# Patient Record
(demographics unavailable — no encounter records)

---

## 2025-05-01 NOTE — PHYSICAL EXAM
[MA] : MA [FreeTextEntry2] : Marychuy Velazquez [Appropriately responsive] : appropriately responsive [Alert] : alert [No Acute Distress] : no acute distress [Oriented x3] : oriented x3 [Labia Majora] : normal [Labia Minora] : normal [Normal] : normal [Uterine Adnexae] : normal

## 2025-05-01 NOTE — HISTORY OF PRESENT ILLNESS
[FreeTextEntry1] : Pt is a 22yo G0 presenting for follow up of AUB. S/p normal pap and GC/CT testing last visit. S/p TVUS with no structural causes of AUB.   Denies personal or family history of bleeding disorder or easy bleeding/bruising. Is interested in switching from cOCP to IUD.   Consent obtained. Upreg neg. Time out performed.  Speculum inserted. Tenaculum placed on anterior lip of cervix.  Os finder used to dilate cervix with some difficulty. Endometrial biopsy pipelle used to sound to 7 cm.  Attempt made to place Mirena IUD but was unsuccessful due to narrow internal cervical os. Attempt made to dilate and reattempt IUD placement which was not successful. Decision made to reattempt procedure after cervical preparation with misoprostol.

## 2025-05-01 NOTE — PLAN
[FreeTextEntry1] :  24yo G0 presenting for follow up of AUB. - Reviewed US in detail with pt  - Counselled on treatment options and pt is interested in hormonal IUD - Attempted to place today but not successful. Misoprostol sent to pt pharmacy. Pt to return for reattempt at placement after cervical preparation.

## 2025-05-01 NOTE — HISTORY OF PRESENT ILLNESS
[FreeTextEntry1] : Pt is a 24yo G0 presenting for follow up of AUB. S/p normal pap and GC/CT testing last visit. S/p TVUS with no structural causes of AUB.   Denies personal or family history of bleeding disorder or easy bleeding/bruising. Is interested in switching from cOCP to IUD.   Consent obtained. Upreg neg. Time out performed.  Speculum inserted. Tenaculum placed on anterior lip of cervix.  Os finder used to dilate cervix with some difficulty. Endometrial biopsy pipelle used to sound to 7 cm.  Attempt made to place Mirena IUD but was unsuccessful due to narrow internal cervical os. Attempt made to dilate and reattempt IUD placement which was not successful. Decision made to reattempt procedure after cervical preparation with misoprostol.

## 2025-05-13 NOTE — HISTORY OF PRESENT ILLNESS
[FreeTextEntry1] : Pt is a 22yo G0 followed for AUB presenting for contraception counseling today. At last visit, reviewed TVUS which was unremarkable with no structural causes of AUB noted. Mirena IUD placement attempted last visit, not successful. Plan made for pt to return today to reattempt after misoprostol cervical preparation.   Pt presents today and reports after further consideration she does not want to reattempt IUD placement. States she is actually now interested in Nexplanon device.

## 2025-05-13 NOTE — PLAN
[FreeTextEntry1] : 22yo G0 followed for AUB presenting for contraception counseling today.  - Contraception options reviewed including LARC (IUD and Nexplanon), DMPA, combined hormonal contraception. - Discussed Nexplanon side effect of irregular bleeding. Pt states she would still like to try this method. - Nexplanon ordered from pharmacy and cOCP refill sent as bridge - Rec condoms for STI prevention  RTC 2 weeks for Nexplanon placement   20 min spent counseling pt and documenting visit.

## 2025-07-01 NOTE — HISTORY OF PRESENT ILLNESS
[FreeTextEntry1] : 24 y/o pt presents in office for follow up after Nexplanon visit. Denies irregular bleeding. States area has healed since placement.

## 2025-07-01 NOTE — PLAN
[FreeTextEntry1] : Nexplanon in correct location with incision healed appropriately. Follow up for annual visit or sooner prn.

## 2025-07-01 NOTE — PHYSICAL EXAM
[Appropriately responsive] : appropriately responsive [Alert] : alert [No Acute Distress] : no acute distress [Oriented x3] : oriented x3 [FreeTextEntry2] : Nexplanon in correct location with incision healed appropriately.